# Patient Record
Sex: MALE | Race: WHITE | Employment: FULL TIME | URBAN - METROPOLITAN AREA
[De-identification: names, ages, dates, MRNs, and addresses within clinical notes are randomized per-mention and may not be internally consistent; named-entity substitution may affect disease eponyms.]

---

## 2019-10-01 ENCOUNTER — OFFICE VISIT (OUTPATIENT)
Dept: BURN CARE | Age: 24
End: 2019-10-01
Payer: COMMERCIAL

## 2019-10-01 VITALS
DIASTOLIC BLOOD PRESSURE: 77 MMHG | HEART RATE: 65 BPM | WEIGHT: 152 LBS | HEIGHT: 71 IN | BODY MASS INDEX: 21.28 KG/M2 | SYSTOLIC BLOOD PRESSURE: 119 MMHG

## 2019-10-01 DIAGNOSIS — T23.061S: Primary | ICD-10-CM

## 2019-10-01 PROBLEM — T23.061A: Status: ACTIVE | Noted: 2019-10-01

## 2019-10-01 PROCEDURE — 99202 OFFICE O/P NEW SF 15 MIN: CPT | Performed by: PLASTIC SURGERY

## 2019-10-01 PROCEDURE — 99202 OFFICE O/P NEW SF 15 MIN: CPT

## 2023-09-20 ENCOUNTER — PROCEDURE VISIT (OUTPATIENT)
Dept: PODIATRY | Age: 28
End: 2023-09-20
Payer: COMMERCIAL

## 2023-09-20 VITALS
SYSTOLIC BLOOD PRESSURE: 118 MMHG | BODY MASS INDEX: 21.98 KG/M2 | HEART RATE: 62 BPM | HEIGHT: 71 IN | WEIGHT: 157 LBS | DIASTOLIC BLOOD PRESSURE: 70 MMHG

## 2023-09-20 DIAGNOSIS — L60.0 OC (ONYCHOCRYPTOSIS): Primary | ICD-10-CM

## 2023-09-20 DIAGNOSIS — L03.031 PARONYCHIA, TOE, RIGHT: ICD-10-CM

## 2023-09-20 DIAGNOSIS — M79.674 PAIN OF TOE OF RIGHT FOOT: ICD-10-CM

## 2023-09-20 PROCEDURE — 99203 OFFICE O/P NEW LOW 30 MIN: CPT | Performed by: PODIATRIST

## 2023-09-20 PROCEDURE — G8420 CALC BMI NORM PARAMETERS: HCPCS | Performed by: PODIATRIST

## 2023-09-20 PROCEDURE — 11750 EXCISION NAIL&NAIL MATRIX: CPT | Performed by: PODIATRIST

## 2023-09-20 PROCEDURE — G8427 DOCREV CUR MEDS BY ELIG CLIN: HCPCS | Performed by: PODIATRIST

## 2023-09-20 PROCEDURE — 4004F PT TOBACCO SCREEN RCVD TLK: CPT | Performed by: PODIATRIST

## 2023-09-20 NOTE — PATIENT INSTRUCTIONS
Patient Instructions: Ingrown Toe Nail Removal    Antibiotic ointment was applied to the toe immediately after the procedure. The ointment is soothing and helps the toe to heal faster. You should apply the antibiotic ointment twice daily until the wound is completely healed. Leave your bandage clean and dry for the remainder of the day. Beginning tomorrow you may remove bandage and shower. Following your shower, soak the toe in warm water and epsom salts for 10 min - perform the epsom salt soaks twice daily for 1 week. After that time perform the soaking once per day for the second week. Gently dry the area and apply antibiotic ointment. Avoid baths and swimming pools for the next 2 weeks. Your bandage will help to pad and protect the wound, while absorbing drainage from the wound. The toe may drain clear fluid for up to 2 weeks (this is normal). You can replace the bandage if blood or fluid soaks the bandage. You may experience some pain after the procedure. If you experience discomfort, elevate and ice your foot. You may take over the counter Tylenol (Acetaminophen) two 325-mg tablets every 4 hours as needed. You should wear loose-fitting shoes or sneakers for the first 2 weeks after the procedure. You may perform activity to tolerance. If you notice any signs of infection including: increased temperature/swelling/redness, thick yellow drainage, fever/chills/nausea/vomiting, please contact the office as soon as possible. NOTE:  Antibiotic ointment provided for your use only is Silvadene Cream.   Silvadene is for topical use only.  :  Potential Side Effects:  Pain, burning, or itching of the treated skin may occur. Skin and mucous membranes (such as the gums) may become blue/gray in color. If any of these effects persist or worsen, tell your doctor or pharmacist promptly.   Remember that your doctor has prescribed this medication because he or she has judged that the benefit to you is